# Patient Record
Sex: FEMALE | Race: BLACK OR AFRICAN AMERICAN | Employment: UNEMPLOYED | URBAN - METROPOLITAN AREA
[De-identification: names, ages, dates, MRNs, and addresses within clinical notes are randomized per-mention and may not be internally consistent; named-entity substitution may affect disease eponyms.]

---
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
## 2021-02-19 ENCOUNTER — HOSPITAL ENCOUNTER (EMERGENCY)
Age: 21
Discharge: HOME | End: 2021-02-19
Attending: EMERGENCY MEDICINE
Payer: MEDICAID

## 2021-02-19 VITALS
SYSTOLIC BLOOD PRESSURE: 153 MMHG | OXYGEN SATURATION: 100 % | TEMPERATURE: 98.2 F | RESPIRATION RATE: 18 BRPM | BODY MASS INDEX: 22.71 KG/M2 | HEART RATE: 85 BPM | DIASTOLIC BLOOD PRESSURE: 97 MMHG | WEIGHT: 145 LBS

## 2021-02-19 DIAGNOSIS — L73.2 HIDRADENITIS AXILLARIS: Primary | ICD-10-CM

## 2021-02-19 PROCEDURE — 74011250637 HC RX REV CODE- 250/637: Performed by: EMERGENCY MEDICINE

## 2021-02-19 PROCEDURE — 99283 EMERGENCY DEPT VISIT LOW MDM: CPT

## 2021-02-19 PROCEDURE — 75810000289 HC I&D ABSCESS SIMP/COMP/MULT

## 2021-02-19 RX ORDER — CLINDAMYCIN HYDROCHLORIDE 150 MG/1
300 CAPSULE ORAL 3 TIMES DAILY
Qty: 60 CAP | Refills: 0 | Status: SHIPPED | OUTPATIENT
Start: 2021-02-19 | End: 2021-03-01

## 2021-02-19 RX ORDER — CLINDAMYCIN HYDROCHLORIDE 150 MG/1
300 CAPSULE ORAL
Status: COMPLETED | OUTPATIENT
Start: 2021-02-19 | End: 2021-02-19

## 2021-02-19 RX ADMIN — CLINDAMYCIN HYDROCHLORIDE 300 MG: 150 CAPSULE ORAL at 02:58

## 2021-02-19 NOTE — ED NOTES
Pt ambulatory to ED bed with c/o possible abscess to the bilateral axilla. She states she has frequent ingrown hairs which cause abscesses. Denies any fevers. Denies any drainage from the areas. AAO x 4 in NAD.

## 2021-02-19 NOTE — ED PROVIDER NOTES
EMERGENCY DEPARTMENT HISTORY AND PHYSICAL EXAM    Date: 2/19/2021  Patient Name: Pawel Cuellar    History of Presenting Illness     Chief Complaint   Patient presents with    Abscess         History Provided By: Patient    Additional History (Context):   3:48 AM  Pawel Cuellar is a 23 y.o. female with PMHX of asthma who presents to the emergency department C/O abscess. Patient has noticed bilateral axillary skin inflammation and vomiting of several days. The right side is worse than the left. She has no fevers or chills radiating pain or discomfort. She acknowledges seeing some underarm antiperspirants. Patient states these lesions have never ocurred in her past.    Social History  Smoking drinking or drugs    Family History  No immunocompromise    PCP: Francoise Garcia MD    Current Facility-Administered Medications   Medication Dose Route Frequency Provider Last Rate Last Admin    lidocaine/EPINEPHrine/tetracaine (LET) topical solution 2 mL  2 mL Topical NOW Stanley Garcia MD         Current Outpatient Medications   Medication Sig Dispense Refill    clindamycin (CLEOCIN) 150 mg capsule Take 2 Caps by mouth three (3) times daily for 10 days. 60 Cap 0    montelukast (SINGULAIR) 10 mg tablet Take 10 mg by mouth daily.  albuterol (PROVENTIL HFA, VENTOLIN HFA, PROAIR HFA) 90 mcg/actuation inhaler Take  by inhalation.  butalbital-acetaminophen-caff (FIORICET) -40 mg per capsule Take 1 Cap by mouth every four (4) hours as needed for Pain. Max Daily Amount: 6 Caps. 12 Cap 0       Past History     Past Medical History:  Past Medical History:   Diagnosis Date    Asthma        Past Surgical History:  No past surgical history on file. Family History:  History reviewed. No pertinent family history.     Social History:  Social History     Tobacco Use    Smoking status: Never Smoker    Smokeless tobacco: Never Used   Substance Use Topics    Alcohol use: No    Drug use: Not on file Allergies: Allergies   Allergen Reactions    Ibuprofen Hives    Penicillins Hives         Review of Systems   Review of Systems   Constitutional: Negative for chills and fever. Skin: Positive for color change and wound. Hematological: Does not bruise/bleed easily. All other systems reviewed and are negative. Physical Exam     Vitals:    02/19/21 0131   BP: (!) 153/97   Pulse: 85   Resp: 18   Temp: 98.2 °F (36.8 °C)   SpO2: 100%   Weight: 65.8 kg (145 lb)     Physical Exam  Vitals signs and nursing note reviewed. Constitutional:       General: She is not in acute distress. Appearance: She is well-developed. She is not diaphoretic. HENT:      Head: Normocephalic and atraumatic. Eyes:      General: No scleral icterus. Extraocular Movements:      Right eye: Normal extraocular motion. Left eye: Normal extraocular motion. Conjunctiva/sclera: Conjunctivae normal.      Pupils: Pupils are equal, round, and reactive to light. Neck:      Musculoskeletal: Normal range of motion and neck supple. Trachea: No tracheal deviation. Cardiovascular:      Rate and Rhythm: Normal rate and regular rhythm. Heart sounds: Normal heart sounds. Pulmonary:      Effort: Pulmonary effort is normal. No respiratory distress. Breath sounds: Normal breath sounds. No stridor. Abdominal:      General: There is no distension. Palpations: Abdomen is soft. Tenderness: There is no abdominal tenderness. Musculoskeletal: Normal range of motion. General: No tenderness. Comments: Grossly unremarkable without abnormalities   Skin:     General: Skin is warm and dry. Capillary Refill: Capillary refill takes less than 2 seconds. Findings: Erythema and wound present. No rash. Comments: Bilateral axillary adenopathy.     There was a small region of fluctuance roughly 1 cm in size on the right  There is no fluctuance noted on the left   Neurological:      Mental Status: She is alert and oriented to person, place, and time. Cranial Nerves: No cranial nerve deficit. Motor: No weakness. Deep Tendon Reflexes: Reflexes are normal and symmetric. Psychiatric:         Mood and Affect: Mood normal.         Behavior: Behavior normal.         Thought Content: Thought content normal.         Judgment: Judgment normal.       Diagnostic Study Results     Labs -   No results found for this or any previous visit (from the past 12 hour(s)). Radiologic Studies -   No orders to display     CT Results  (Last 48 hours)    None        CXR Results  (Last 48 hours)    None          Medications given in the ED-  Medications   lidocaine/EPINEPHrine/tetracaine (LET) topical solution 2 mL (has no administration in time range)   clindamycin (CLEOCIN) capsule 300 mg (300 mg Oral Given 2/19/21 0258)         Medical Decision Making   I am the first provider for this patient. I reviewed the vital signs, available nursing notes, past medical history, past surgical history, family history and social history. Vital Signs-Reviewed the patient's vital signs. Pulse Oximetry Analysis -100% on room air    Records Reviewed: NURSING NOTES AND PREVIOUS MEDICAL RECORDS    Provider Notes (Medical Decision Making):   Patient has bilateral inflammation with a small possibility of abscess on the right. New medications. Drain small amount of fluid from this region. Tubes are in and avoiding all under arm deodorants. Outpatient referral to PCP enter surgery is recommended.     Procedures:  I&D Abcess Simple    Date/Time: 2/19/2021 3:20 PM  Performed by: Hipolito Sommer MD  Authorized by: Hipolito Sommer MD     Consent:     Consent obtained:  Verbal    Risks discussed:  Bleeding, damage to other organs, incomplete drainage, infection and pain    Alternatives discussed:  No treatment and delayed treatment  Location:     Type:  Abscess    Size:  1 cm    Location:  Upper extremity Upper extremity location:  Shoulder    Shoulder location:  R shoulder  Pre-procedure details:     Skin preparation:  Betadine and Hibiclens  Anesthesia (see MAR for exact dosages): Anesthesia method:  Topical application and local infiltration    Topical anesthetic:  LET    Local anesthetic:  Lidocaine 1% w/o epi  Procedure type:     Complexity:  Simple  Procedure details:     Needle aspiration: yes      Needle size:  18 G    Wound management:  Irrigated with saline    Drainage:  Purulent and serous    Drainage amount:  Scant    Wound treatment:  Wound left open    Packing materials:  None (small band aid and wick applied)  Post-procedure details:     Patient tolerance of procedure: Tolerated well, no immediate complications        ED Course:   3:48 AM: Initial assessment performed. The patients presenting problems have been discussed, and they are in agreement with the care plan formulated and outlined with them. I have encouraged them to ask questions as they arise throughout their visit. Diagnosis and Disposition       DISCHARGE NOTE:  3: 45 AM  Shayla Stoll's  results have been reviewed with her. She has been counseled regarding her diagnosis, treatment, and plan. She verbally conveys understanding and agreement of the signs, symptoms, diagnosis, treatment and prognosis and additionally agrees to follow up as discussed. She also agrees with the care-plan and conveys that all of her questions have been answered. I have also provided discharge instructions for her that include: educational information regarding their diagnosis and treatment, and list of reasons why they would want to return to the ED prior to their follow-up appointment, should her condition change. She has been provided with education for proper emergency department utilization. CLINICAL IMPRESSION:    1. Hidradenitis axillaris        PLAN:  1. D/C Home  2.    Current Discharge Medication List      START taking these medications    Details   clindamycin (CLEOCIN) 150 mg capsule Take 2 Caps by mouth three (3) times daily for 10 days. Qty: 60 Cap, Refills: 0           3. Follow-up Information     Follow up With Specialties Details Why Renée Vazquez MD Pediatric Medicine In 1 week  [address] [facility] Avelina Grover MD General Surgery In 2 weeks As needed [place] [address] [address] [facility] EMERGENCY DEPT Emergency Medicine  As needed [address] [place] [id number]  [phone]        _______________________________    This note was partially transcribed via voice recognition software. Although efforts have been made to catch any discrepancies, it may contain sound alike words, grammatical errors, or nonsensical words.

## 2021-02-19 NOTE — ED NOTES
Called pharmacy to receive LET. Per Binu Boyd, pharmacist it is not in stock at this time. Provider advised.